# Patient Record
Sex: FEMALE | Race: BLACK OR AFRICAN AMERICAN | Employment: PART TIME | URBAN - METROPOLITAN AREA
[De-identification: names, ages, dates, MRNs, and addresses within clinical notes are randomized per-mention and may not be internally consistent; named-entity substitution may affect disease eponyms.]

---

## 2018-08-20 ENCOUNTER — HOSPITAL ENCOUNTER (EMERGENCY)
Age: 38
Discharge: HOME | End: 2018-08-20
Attending: EMERGENCY MEDICINE
Payer: SELF-PAY

## 2018-08-20 VITALS
TEMPERATURE: 98 F | DIASTOLIC BLOOD PRESSURE: 78 MMHG | OXYGEN SATURATION: 98 % | HEIGHT: 65 IN | RESPIRATION RATE: 18 BRPM | BODY MASS INDEX: 30.82 KG/M2 | HEART RATE: 90 BPM | SYSTOLIC BLOOD PRESSURE: 135 MMHG | WEIGHT: 185 LBS

## 2018-08-20 DIAGNOSIS — R10.13 ABDOMINAL PAIN, EPIGASTRIC: Primary | ICD-10-CM

## 2018-08-20 LAB
ALBUMIN SERPL-MCNC: 3.5 G/DL (ref 3.5–5)
ALBUMIN/GLOB SERPL: 0.7 {RATIO} (ref 1.2–3.5)
ALP SERPL-CCNC: 64 U/L (ref 50–136)
ALT SERPL-CCNC: 41 U/L (ref 12–65)
ANION GAP SERPL CALC-SCNC: 11 MMOL/L (ref 7–16)
AST SERPL-CCNC: 47 U/L (ref 15–37)
ATRIAL RATE: 71 BPM
BASOPHILS # BLD: 0 K/UL
BASOPHILS NFR BLD: 0 % (ref 0–2)
BILIRUB SERPL-MCNC: 0.5 MG/DL (ref 0.2–1.1)
BUN SERPL-MCNC: 18 MG/DL (ref 6–23)
CALCIUM SERPL-MCNC: 10 MG/DL (ref 8.3–10.4)
CALCULATED P AXIS, ECG09: 83 DEGREES
CALCULATED R AXIS, ECG10: 72 DEGREES
CALCULATED T AXIS, ECG11: 48 DEGREES
CHLORIDE SERPL-SCNC: 109 MMOL/L (ref 98–107)
CO2 SERPL-SCNC: 24 MMOL/L (ref 21–32)
CREAT SERPL-MCNC: 0.9 MG/DL (ref 0.6–1)
DIAGNOSIS, 93000: NORMAL
DIFFERENTIAL METHOD BLD: ABNORMAL
EOSINOPHIL # BLD: 0 K/UL
EOSINOPHIL NFR BLD: 0 % (ref 0.5–7.8)
ERYTHROCYTE [DISTWIDTH] IN BLOOD BY AUTOMATED COUNT: 14.5 %
GLOBULIN SER CALC-MCNC: 5 G/DL (ref 2.3–3.5)
GLUCOSE SERPL-MCNC: 129 MG/DL (ref 65–100)
HCT VFR BLD AUTO: 44.8 % (ref 35.8–46.3)
HGB BLD-MCNC: 14.4 G/DL (ref 11.7–15.4)
IMM GRANULOCYTES # BLD: 0 K/UL
IMM GRANULOCYTES NFR BLD AUTO: 0 % (ref 0–5)
LIPASE SERPL-CCNC: 69 U/L (ref 73–393)
LYMPHOCYTES # BLD: 1.1 K/UL
LYMPHOCYTES NFR BLD: 16 % (ref 13–44)
MCH RBC QN AUTO: 25.8 PG (ref 26.1–32.9)
MCHC RBC AUTO-ENTMCNC: 32.1 G/DL (ref 31.4–35)
MCV RBC AUTO: 80.3 FL (ref 79.6–97.8)
MONOCYTES # BLD: 0.1 K/UL
MONOCYTES NFR BLD: 1 % (ref 4–12)
NEUTS SEG # BLD: 5.5 K/UL
NEUTS SEG NFR BLD: 83 % (ref 43–78)
NRBC # BLD: 0 K/UL (ref 0–0.2)
P-R INTERVAL, ECG05: 134 MS
PLATELET # BLD AUTO: 336 K/UL (ref 150–450)
PMV BLD AUTO: 9.9 FL (ref 9.4–12.3)
POTASSIUM SERPL-SCNC: 5.6 MMOL/L (ref 3.5–5.1)
PROT SERPL-MCNC: 8.5 G/DL (ref 6.3–8.2)
Q-T INTERVAL, ECG07: 372 MS
QRS DURATION, ECG06: 76 MS
QTC CALCULATION (BEZET), ECG08: 404 MS
RBC # BLD AUTO: 5.58 M/UL (ref 4.05–5.2)
SODIUM SERPL-SCNC: 144 MMOL/L (ref 136–145)
VENTRICULAR RATE, ECG03: 71 BPM
WBC # BLD AUTO: 6.6 K/UL (ref 4.3–11.1)

## 2018-08-20 PROCEDURE — 85025 COMPLETE CBC W/AUTO DIFF WBC: CPT

## 2018-08-20 PROCEDURE — 99284 EMERGENCY DEPT VISIT MOD MDM: CPT | Performed by: EMERGENCY MEDICINE

## 2018-08-20 PROCEDURE — 83690 ASSAY OF LIPASE: CPT

## 2018-08-20 PROCEDURE — 74011250637 HC RX REV CODE- 250/637: Performed by: EMERGENCY MEDICINE

## 2018-08-20 PROCEDURE — 93005 ELECTROCARDIOGRAM TRACING: CPT | Performed by: EMERGENCY MEDICINE

## 2018-08-20 PROCEDURE — 80053 COMPREHEN METABOLIC PANEL: CPT

## 2018-08-20 RX ORDER — ONDANSETRON 4 MG/1
4 TABLET, ORALLY DISINTEGRATING ORAL
Qty: 15 TAB | Refills: 0 | Status: SHIPPED | OUTPATIENT
Start: 2018-08-20

## 2018-08-20 RX ORDER — FAMOTIDINE 20 MG/1
20 TABLET, FILM COATED ORAL 2 TIMES DAILY
Qty: 20 TAB | Refills: 0 | Status: SHIPPED | OUTPATIENT
Start: 2018-08-20 | End: 2018-09-19

## 2018-08-20 RX ADMIN — Medication 30 ML: at 01:40

## 2018-08-20 NOTE — ED PROVIDER NOTES
HPI:  42 female here with complaint of epigastric, left upper quadrant pain with associated nausea for the past 2 weeks. No vomiting. Stated she has acid like sensation going up her chest.  Worsen with food. She has been seen at the urgent care twice for right hip pain. They started her on steroid and anti-inflammatory she came back for repeat evaluation and a placed her on stronger dose of steroids and Ultram and more anti-inflammatory. She does not drink alcohol. No cough. Has had total hysterectomy and is not on birth control. Denies any shortness of breath, recent travel, leg swelling, dyspnea with exertion. ROS  Constitutional: No fever, no chills  Skin: no rash  Eye: No vision changes  ENMT:   Respiratory: No shortness of breath, no cough  Cardiovascular: No chest pain, no palpitations  Gastrointestinal: No vomiting, + nausea, no diarrhea, + abdominal pain  : No dysuria  MSK: No back pain, no muscle pain  Neuro: no change in mental status, no numbness, no tingling, no weakness  Psych:   Endocrine:   All other review of systems positive per history of present illness and the above otherwise negative or noncontributory. Visit Vitals    /86 (BP 1 Location: Right arm, BP Patient Position: At rest)    Pulse 89    Temp 98.5 °F (36.9 °C)    Resp 18    Ht 5' 5\" (1.651 m)    Wt 83.9 kg (185 lb)    SpO2 97%    BMI 30.79 kg/m2     Past Medical History:   Diagnosis Date    Allergic rhinitis     Bronchitis     Fibroids     uterine    Headache     stress ha    Hyperthyroidism      Past Surgical History:   Procedure Laterality Date    HX TUBAL LIGATION  08/2007     Prior to Admission Medications   Prescriptions Last Dose Informant Patient Reported? Taking? albuterol (PROVENTIL HFA, VENTOLIN HFA, PROAIR HFA) 90 mcg/actuation inhaler   No No   Sig: Take 2 puffs by inhalation every six (6) hours as needed for Wheezing or Shortness of Breath.    Patient taking differently: Take 2 Puffs by inhalation every six (6) hours as needed for Wheezing or Shortness of Breath. Take if needed DOS per anesthesia protocol. Bring DOS   cetirizine (ZYRTEC) 10 mg tablet   Yes No   Sig: Take 10 mg by mouth daily. Take DOS per anesthesia protocol. Indications: ALLERGIC RHINITIS   ketorolac (TORADOL) 10 mg tablet   No No   Sig: Take 1 Tab by mouth every six (6) hours. oxyCODONE-acetaminophen (PERCOCET 7.5) 7.5-325 mg per tablet   No No   Sig: Take 1 Tab by mouth every four (4) hours as needed for Pain. Max Daily Amount: 6 Tabs. Facility-Administered Medications: None         Adult Exam   General: alert, no acute distress  Head: normocephalic, atraumatic  ENT: moist mucous membranes  Neck: supple, non-tender; full range of motion  Cardiovascular: regular rate and rhythm, normal peripheral perfusion, no edema  Respiratory:  normal respirations; no wheezing, rales or rhonchi  Gastrointestinal: soft, no focal tenderness in right upper quadrant. Negative Fields. Pain in epigastrium. No pain in the left upper quadrant. No pain in the right lower, left lower. No CVA tenderness. no rebound or guarding, no peritoneal signs, no distension  Back: non-tender, full range of motion  Musculoskeletal: normal range of motion, normal strength, no gross deformities  Neurological: alert and oriented x 4, no gross focal deficits; normal speech  Psychiatric: cooperative; appropriate mood and affect    MDM:the lungs are clear. No history of heart disease. She is on steroids, anti-inflammatory and see with epigastric, left upper quadrant pain. Concern for dyspepsia, ulcer. Do not suspect PE, acute ACS. However will obtain an EKG. We will give GI cocktail, basic lab work to assess for possible pancreatitis however does not have history consistent with pancreatitis. No obvious focal pain in the right upper quadrant. If LFTs abnormal with consider ultrasound.     ED Course   EKG rate of 87 sinus rhythm normal axis and interval.  No STEMI, ischemic changes noted. No peak T waves. No ectopy or Brugada. Normal  Lipase. Low suspicion for pancreatitis. Normal LFTs. Low suspicion for cholecystitis, obstructive pathology. Mild elevated potassium of 5.6. No EKG changes. Do not feel any treatment is necessary. I suspect this is secondary to gastritis, possible ulcer. Do not suspect ACS, perforation, colitis, diverticulitis, PE, pneumonia, pneumothorax  We'll place on Pepcid 20 mg twice a day. Recommend follow-up with primary care physician and GI doctor. Recommend to stop steroid and anti-inflammatory and Ultram.  We'll give her follow-up referral to orthopedics for assessment of her hip pain. No results found. Recent Results (from the past 24 hour(s))   CBC WITH AUTOMATED DIFF    Collection Time: 08/20/18 12:30 AM   Result Value Ref Range    WBC 6.6 4.3 - 11.1 K/uL    RBC 5.58 (H) 4.05 - 5.2 M/uL    HGB 14.4 11.7 - 15.4 g/dL    HCT 44.8 35.8 - 46.3 %    MCV 80.3 79.6 - 97.8 FL    MCH 25.8 (L) 26.1 - 32.9 PG    MCHC 32.1 31.4 - 35.0 g/dL    RDW 14.5 %    PLATELET 899 899 - 500 K/uL    MPV 9.9 9.4 - 12.3 FL    ABSOLUTE NRBC 0.00 0.0 - 0.2 K/uL    DF AUTOMATED      NEUTROPHILS 83 (H) 43 - 78 %    LYMPHOCYTES 16 13 - 44 %    MONOCYTES 1 (L) 4.0 - 12.0 %    EOSINOPHILS 0 (L) 0.5 - 7.8 %    BASOPHILS 0 0.0 - 2.0 %    IMMATURE GRANULOCYTES 0 0.0 - 5.0 %    ABS. NEUTROPHILS 5.5 K/UL    ABS. LYMPHOCYTES 1.1 K/UL    ABS. MONOCYTES 0.1 K/UL    ABS. EOSINOPHILS 0.0 K/UL    ABS. BASOPHILS 0.0 K/UL    ABS. IMM.  GRANS. 0.0 K/UL   METABOLIC PANEL, COMPREHENSIVE    Collection Time: 08/20/18 12:30 AM   Result Value Ref Range    Sodium 144 136 - 145 mmol/L    Potassium 5.6 (H) 3.5 - 5.1 mmol/L    Chloride 109 (H) 98 - 107 mmol/L    CO2 24 21 - 32 mmol/L    Anion gap 11 7 - 16 mmol/L    Glucose 129 (H) 65 - 100 mg/dL    BUN 18 6 - 23 MG/DL    Creatinine 0.90 0.6 - 1.0 MG/DL    GFR est AA >60 >60 ml/min/1.73m2    GFR est non-AA >60 >60 ml/min/1.73m2    Calcium 10.0 8.3 - 10.4 MG/DL    Bilirubin, total 0.5 0.2 - 1.1 MG/DL    ALT (SGPT) 41 12 - 65 U/L    AST (SGOT) 47 (H) 15 - 37 U/L    Alk. phosphatase 64 50 - 136 U/L    Protein, total 8.5 (H) 6.3 - 8.2 g/dL    Albumin 3.5 3.5 - 5.0 g/dL    Globulin 5.0 (H) 2.3 - 3.5 g/dL    A-G Ratio 0.7 (L) 1.2 - 3.5     LIPASE    Collection Time: 08/20/18 12:30 AM   Result Value Ref Range    Lipase 69 (L) 73 - 393 U/L         Dragon voice recognition software was used to create this note. Although the note has been reviewed and corrected where necessary, additional errors may have been overlooked and remain in the text.

## 2018-08-20 NOTE — ED TRIAGE NOTES
Pt c\o abd pain for the last 3 week states it is getting worse states she is having nausea.  pts last bm was this morning and normal per pt

## 2018-08-20 NOTE — DISCHARGE INSTRUCTIONS
Indigestion (Dyspepsia or Heartburn): Care Instructions  Your Care Instructions  Sometimes it can be hard to pinpoint the cause of indigestion. (It is also called dyspepsia or heartburn.) Most cases of an upset stomach with bloating, burning, burping, and nausea are minor and go away within several hours. Home treatment and over-the-counter medicine often are able to control symptoms. But if you take medicine to relieve your indigestion without making diet and lifestyle changes, your symptoms are likely to return again and again. If you get indigestion often, it may be a sign of a more serious medical problem. Be sure to follow up with your doctor, who may want to do tests to be sure of the cause of your indigestion. Follow-up care is a key part of your treatment and safety. Be sure to make and go to all appointments, and call your doctor if you are having problems. It's also a good idea to know your test results and keep a list of the medicines you take. How can you care for yourself at home? · Your doctor may recommend over-the-counter medicine. For mild or occasional indigestion, antacids such as Gaviscon, Mylanta, Maalox, or Tums, may help. Be safe with medicines. Be careful when you take over-the-counter antacid medicines. Many of these medicines have aspirin in them. Read the label to make sure that you are not taking more than the recommended dose. Too much aspirin can be harmful. · Your doctor also may recommend over-the-counter acid reducers, such as Pepcid AC, Tagamet HB, Zantac 75, or Prilosec. Read and follow all instructions on the label. If you use these medicines often, talk with your doctor. · Change your eating habits. ¨ It's best to eat several small meals instead of two or three large meals. ¨ After you eat, wait 2 to 3 hours before you lie down. ¨ Chocolate, mint, and alcohol can make GERD worse.   ¨ Spicy foods, foods that have a lot of acid (like tomatoes and oranges), and coffee can make GERD symptoms worse in some people. If your symptoms are worse after you eat a certain food, you may want to stop eating that food to see if your symptoms get better. · Do not smoke or chew tobacco. Smoking can make GERD worse. If you need help quitting, talk to your doctor about stop-smoking programs and medicines. These can increase your chances of quitting for good. · If you have GERD symptoms at night, raise the head of your bed 6 to 8 inches. You can do this by putting the frame on blocks or placing a foam wedge under the head of your mattress. (Adding extra pillows does not work.)  · Do not wear tight clothing around your middle. · Lose weight if you need to. Losing just 5 to 10 pounds can help. · Do not take anti-inflammatory medicines, such as aspirin, ibuprofen (Advil, Motrin), or naproxen (Aleve). These can irritate the stomach. If you need a pain medicine, try acetaminophen (Tylenol), which does not cause stomach upset. When should you call for help? Call your doctor now or seek immediate medical care if:    · You have new or worse belly pain.     · You are vomiting.    Watch closely for changes in your health, and be sure to contact your doctor if:    · You have new or worse symptoms of indigestion.     · You have trouble or pain swallowing.     · You are losing weight.     · You do not get better as expected. Where can you learn more? Go to http://fortunato-kamaljit.info/. Enter R110 in the search box to learn more about \"Indigestion (Dyspepsia or Heartburn): Care Instructions. \"  Current as of: May 12, 2017  Content Version: 11.7  © 4186-7046 [employer]. Care instructions adapted under license by [employer] (which disclaims liability or warranty for this information).  If you have questions about a medical condition or this instruction, always ask your healthcare professional. [address] any warranty or liability for your use of this information.

## 2018-08-20 NOTE — ED NOTES
I have reviewed discharge instructions with the patient. The patient verbalized understanding. Patient left ED via Discharge Method: ambulatory to Home with self. Opportunity for questions and clarification provided. Patient given 2 scripts. To continue your aftercare when you leave the hospital, you may receive an automated call from our care team to check in on how you are doing. This is a free service and part of our promise to provide the best care and service to meet your aftercare needs.  If you have questions, or wish to unsubscribe from this service please call [phone]. Thank you for Choosing our [facility] Emergency Department.

## 2020-07-14 NOTE — LETTER
[address] [facility] EMERGENCY DEPT [address] [address] [phone] Work/School Note Date: 8/20/2018 To Whom It May concern: 
 
Eric Buck was seen and treated today in the emergency room by the following provider(s): 
Attending Provider: Chantelle Cash MD. Eric Heber {Return to school/sport/work: 8/21/18 Sincerely, Chantelle Cash MD 
 
 
 
 From: Madison Fenton  Sent: 7/14/2020 11:18 AM CDT  To: [facility] Nurse Msg Pool  Subject: RE: Lab Test or Test Related Question    Hello.     The next available appointment for a preventative visit was October 2, I did make that appointment.    Please see my records with another healthcare system for the past 2.5 years ([facility]) as I had switched insurance for that time.    The CDC states that antibodies to COVID19 happen 1-3 weeks after having coronavirus. I understand that it's not normal protocol to order these tests but I would like to be tested. I would also like to have my regular preventative testing prior to my appointment in October.    Thanks so much!  Madison Fenton  ----- Message -----  From: Office of Daniella Grissom MD  Sent: 7/13/2020 2:55 PM CDT  To: Madison Fenton  Subject: RE: Lab Test or Test Related Question  Good afternoon Madison,     There is no need to order another antibody test due to you having a positive COVID test.     There is a chance that the antibody test would still come back negative because this can take up to 2-3 months for this to come back positive due to the antibodies needing to form and show up on the test.     Also, you have not been seen in the clinic since February 2017 so you are overdue for an appointment. Please call our office to make an appointment. ([phone])    Have a good day!       ----- Message -----   From: Madison Fenton   Sent: 7/13/2020 1:45 PM CDT   To: Daniella Grissom MD  Subject: Lab Test or Test Related Question    Vladimir Robert.     I had an abnormal COVID19 test on July 2.   I'm requesting another antibody test, are you able to order that for me?    Thanks for your time.  Madison Fenton